# Patient Record
Sex: MALE | Race: ASIAN | NOT HISPANIC OR LATINO | Employment: FULL TIME | URBAN - METROPOLITAN AREA
[De-identification: names, ages, dates, MRNs, and addresses within clinical notes are randomized per-mention and may not be internally consistent; named-entity substitution may affect disease eponyms.]

---

## 2017-02-08 ENCOUNTER — OFFICE VISIT (OUTPATIENT)
Dept: FAMILY MEDICINE | Facility: CLINIC | Age: 32
End: 2017-02-08

## 2017-02-08 VITALS
BODY MASS INDEX: 22.79 KG/M2 | WEIGHT: 128.6 LBS | OXYGEN SATURATION: 97 % | HEIGHT: 63 IN | DIASTOLIC BLOOD PRESSURE: 70 MMHG | SYSTOLIC BLOOD PRESSURE: 110 MMHG | HEART RATE: 62 BPM | TEMPERATURE: 98.3 F

## 2017-02-08 DIAGNOSIS — E78.2 MIXED HYPERLIPIDEMIA: ICD-10-CM

## 2017-02-08 DIAGNOSIS — E55.9 VITAMIN D DEFICIENCY: ICD-10-CM

## 2017-02-08 DIAGNOSIS — F17.200 TOBACCO USE DISORDER: ICD-10-CM

## 2017-02-08 DIAGNOSIS — Z00.00 ROUTINE HISTORY AND PHYSICAL EXAMINATION OF ADULT: Primary | ICD-10-CM

## 2017-02-08 DIAGNOSIS — R73.09 ABNORMAL GLUCOSE: ICD-10-CM

## 2017-02-08 LAB — HBA1C MFR BLD: 5.5 % (ref 4–7)

## 2017-02-08 PROCEDURE — 83036 HEMOGLOBIN GLYCOSYLATED A1C: CPT | Performed by: FAMILY MEDICINE

## 2017-02-08 PROCEDURE — 80061 LIPID PANEL: CPT | Mod: 90 | Performed by: FAMILY MEDICINE

## 2017-02-08 PROCEDURE — 84450 TRANSFERASE (AST) (SGOT): CPT | Mod: 90 | Performed by: FAMILY MEDICINE

## 2017-02-08 PROCEDURE — 80048 BASIC METABOLIC PNL TOTAL CA: CPT | Mod: 90 | Performed by: FAMILY MEDICINE

## 2017-02-08 PROCEDURE — 82306 VITAMIN D 25 HYDROXY: CPT | Mod: 90 | Performed by: FAMILY MEDICINE

## 2017-02-08 PROCEDURE — 36415 COLL VENOUS BLD VENIPUNCTURE: CPT | Performed by: FAMILY MEDICINE

## 2017-02-08 PROCEDURE — 99395 PREV VISIT EST AGE 18-39: CPT | Performed by: FAMILY MEDICINE

## 2017-02-08 RX ORDER — VARENICLINE TARTRATE 1 MG/1
1 TABLET, FILM COATED ORAL 2 TIMES DAILY
Qty: 56 TABLET | Refills: 2 | Status: SHIPPED | OUTPATIENT
Start: 2017-02-08 | End: 2019-04-16

## 2017-02-08 NOTE — PROGRESS NOTES
SUBJECTIVE:    32 year old male presents for a physical exam.    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 (father had his first MI in his 30's)    Discussion regarding cardiac risk factors:    Family HX: yes    Tobacco use: yes, requests RX chantix/ has tried before- no significant side effects  Hypertension: No  Hyperlipidemia: yes   LDL CHOLESTEROL CALCULATED   Date Value Ref Range Status   2014 139* <130 mg/dL (calc) Final     Comment:        Desirable range <100 mg/dL for patients with CHD or  diabetes and <70 mg/dL for diabetic patients with  known heart disease.      ]  LDL CHOLESTEROL CALCULATED   Date Value Ref Range Status   2017 168* <130 mg/dL (calc) Final     Comment:        Desirable range <100 mg/dL for patients with CHD or  diabetes and <70 mg/dL for diabetic patients with  known heart disease.        ]    Diabetis:  No    Exercise: No formal program- time constraints  Diet:  Poor- fast food- does not like vegetables / picky eater    Health Care Maintenance  Flu shot: recommended    Vitamin D supplement- history of deficiency  He is not taking a supplement    Patient Active Problem List   Diagnosis     Cervicalgia     Fam hx-ischem heart disease     Mixed hyperlipidemia     Vitamin deficiency     Health Care Home     Living will, counseling/discussion     Abnormal glucose     Past Surgical History   Procedure Laterality Date     None       '  Family History   Problem Relation Age of Onset     C.A.D. Father      C.A.D. Paternal Uncle      Hypertension Father      Lipids Father      Maternal grandparents  young    No current outpatient prescriptions on file.     No current facility-administered medications for this visit.     Review  "Of Systems  Skin: negative  Eyes: negative  Ears/Nose/Throat: negative  Respiratory: No shortness of breath, dyspnea on exertion, cough, or hemoptysis/ encouraged tobacco cessation  Cardiovascular: negative- he denies exertional chest pain/ or change in exercise tolerance  Gastrointestinal: negative  Genitourinary: negative  Musculoskeletal: left shoulder and neck / cracks with ROM of shoulder- doing OK- sees chiropracter (also treats low back pain)  Neurologic: negative  Psychiatric: negative  Hematologic/Lymphatic/Immunologic: negative  Endocrine: negative    OBJECTIVE:  /70 mmHg  Pulse 62  Temp(Src) 98.3  F (36.8  C) (Oral)  Ht 1.6 m (5' 3\")  Wt 58.333 kg (128 lb 9.6 oz)  BMI 22.79 kg/m2  SpO2 97%  General appearance: Healthy.  Skin: Normal.  Mental Status: cooperative, normal affect, no gross thought process defects.  HEENT:   Ears- Normal external canals and TMs  Eyes- PERRL, EOM's intact   Throat- Normal  Nodes-Negative  Thyroid: Normal to palpation.   No enlargement or nodules palpated.  Lungs: Clear to auscultation.   Heart.: Normal rate and rhythm.  No murmurs, clicks, or gallops.  Pulses: C- 4/4, R-4/4, F-4/4, P-4/4, PT-4/4, DP-4/4  Abdomen: BS active. Soft, non-tender, no masses or organomegaly.  Aorta normal. No bruits.  Genitalia: Normal testicular exam, no hernia  Rectal:Deferred  Extremities: Normal   Neuro: no focal findings    Assessment   (Z00.00) Routine history and physical examination of adult  (primary encounter diagnosis)  Comment: multiple risk factors for coronary artery disease- initiate  statin and ASA or offer consult to preventative cardiology  Plan: Lipid Profile, VENOUS COLLECTION, BASIC         METABOLIC PANEL (QUEST), Vitamin D Deficiency,         Hemoglobin A1c (B[facility])            (E55.9) Vitamin D deficiency  Comment:  High dose vitamin D for 20 weeks/ then take daily dose 1000 IU daily  Plan: VENOUS COLLECTION, Vitamin D Deficiency            (R73.09) Abnormal " glucose  Comment: Normal Hemoglobin A1C/ 5.5  Plan: VENOUS COLLECTION, BASIC METABOLIC PANEL         (QUEST), Hemoglobin A1c (BFP)            (E78.2) Mixed hyperlipidemia  Comment: high LDL/ start statin after baseline AST obtained  Plan: Lipid Profile, VENOUS COLLECTION              (F17.200) Tobacco use disorder  Comment: smoking cessation encouraged  Plan: varenicline (CHANTIX STARTING MONTH RAYN) 0.5 MG        X 11 & 1 MG X 42 tablet, varenicline (CHANTIX)         1 MG tablet

## 2017-02-08 NOTE — LETTER
[facility]  [facility] [address]  [place]  [address]    February 10, 2017        Lisa Cuellar  [address] [facility] [address]              Dear Lisa Cuellar      LAB RESULTS:     The results of your recent Diabetis Screening Test (Hemoglobin A1C) and kidney function tests were NORMAL.    Vitamin D Level is VERY LOW. Vitamin D is important for bone and nerve health. I have sent a prescription for high dose Vitamin D3- 50,0000 IU to be dosed once a WEEK for 20 weeks.  After completing , start daily Vitamin D3 2000 IU daily.    Lipid profile is Abnormal.  Low HDL or good cholesterol 38 (goal is above 40- improves with exercise)  High LDL or bad cholesterol 168 - goal is less than 130 (improves with a high fiber low fat diet- lots of fruits, vegetables and whole grains and avoid fast and processed food)  Normal triglycerides.    Because you have many risk factors for heart disease, I am recommending starting a cholesterol lowering medication (atorvastatin 10 mg daily). Repeat your lipid profile 8-12 weeks after starting atorvastatin. This can be a nonfasting lab appointment if you have no concerns.  Take a baby aspirin daily (81 mg)  STOP SMOKING    If you would prefer, I can refer you to cardiology for a second opinion. Let me know.          Roxy Monsivais MD   [phone]

## 2017-02-09 LAB — VITAMIN D, 25-OH, TOTAL - QUEST: 5 NG/ML (ref 30–100)

## 2017-02-11 LAB
AST SERPL-CCNC: 25 U/L (ref 10–40)
BUN SERPL-MCNC: 12 MG/DL (ref 7–25)
BUN/CREATININE RATIO: ABNORMAL (CALC) (ref 6–22)
CALCIUM SERPL-MCNC: 10.1 MG/DL (ref 8.6–10.3)
CHLORIDE SERPLBLD-SCNC: 106 MMOL/L (ref 98–110)
CHOLEST SERPL-MCNC: 232 MG/DL (ref 125–200)
CHOLEST/HDLC SERPL: 6.1 (CALC)
CO2 SERPL-SCNC: 24 MMOL/L (ref 20–31)
CREAT SERPL-MCNC: 0.84 MG/DL (ref 0.6–1.35)
EGFR AFRICAN AMERICAN - QUEST: 133 ML/MIN/1.73M2
GFR SERPL CREATININE-BSD FRML MDRD: 115 ML/MIN/1.73M2
GLUCOSE - QUEST: 106 MG/DL (ref 65–99)
HDLC SERPL-MCNC: 38 MG/DL
LDLC SERPL CALC-MCNC: 168 MG/DL (CALC)
NONHDLC SERPL-MCNC: 194 MG/DL (CALC)
POTASSIUM SERPL-SCNC: 4 MMOL/L (ref 3.5–5.3)
SODIUM SERPL-SCNC: 139 MMOL/L (ref 135–146)
TRIGL SERPL-MCNC: 130 MG/DL

## 2017-02-13 RX ORDER — ATORVASTATIN CALCIUM 10 MG/1
10 TABLET, FILM COATED ORAL DAILY
Qty: 90 TABLET | Refills: 0 | Status: SHIPPED | OUTPATIENT
Start: 2017-02-13 | End: 2017-06-13

## 2017-06-13 ENCOUNTER — TELEPHONE (OUTPATIENT)
Dept: FAMILY MEDICINE | Facility: CLINIC | Age: 32
End: 2017-06-13

## 2017-06-13 DIAGNOSIS — E55.9 VITAMIN D DEFICIENCY: ICD-10-CM

## 2017-06-13 DIAGNOSIS — M79.10 MUSCLE PAIN: Primary | ICD-10-CM

## 2017-06-13 DIAGNOSIS — E78.2 MIXED HYPERLIPIDEMIA: ICD-10-CM

## 2017-06-13 RX ORDER — ATORVASTATIN CALCIUM 10 MG/1
10 TABLET, FILM COATED ORAL DAILY
Qty: 30 TABLET | Refills: 0 | COMMUNITY
Start: 2017-06-13 | End: 2017-07-03

## 2017-06-13 NOTE — TELEPHONE ENCOUNTER
Pt is requesting the following prescription.     Pending Prescriptions:                       Disp   Refills    cholecalciferol (VITAMIN D3) 56765 UNITS *20 cap*0            Sig: Take 1 capsule (50,000 Units) by mouth once a           week    Pt is to be on high dose of Vitamin D for 20 weeks.   Pt start Vitamin D on 2/10/17.    Do you want to refill this prescription?    Balbina.SHERRI Rosado ([facility])

## 2017-06-13 NOTE — TELEPHONE ENCOUNTER
Patient is due for a medication recheck for the following medication ATORVASTATIN ,and meets the qualifications for a physician approved 30 day extension.    A #30 day refill has been called into the pharmacy listed.  Faxed into [facility] in [facility]      staff, per the refill protocol can you please call the patient and help assist in setting up a  FASTING medication recheck.  Please attempt to reach the patient to schedule that appointment, and if you are unable to reach them, please forward back to the prescribing physician.      Telephone Information:   Mobile [phone] (home) [phone] (work)      Thank You  SHERRI Godoy ([facility])

## 2017-06-14 NOTE — TELEPHONE ENCOUNTER
50,000 IU weekly does not need a prescription (is available over the counter)- I have sent RX in past    SHOULD GET A VITAMIN D LEVEL- this can be a lab only   Vitamin D can be detrimental if you take too much    I will also check his CK  Standing orders in epic    Lab appointment is OK, but would also see him in the office if he wishes

## 2017-06-14 NOTE — TELEPHONE ENCOUNTER
Called Pt to help him set up a Lab only to check his Vitamin D and his CK.   Got his VM and left a message to have him call us back to set this up.   Left him the CS line. This is also NON-FASTING and can be in the evening to help him not have to miss work.     Balbina.SHERRI Rosado ([facility])

## 2017-07-03 ENCOUNTER — OFFICE VISIT (OUTPATIENT)
Dept: FAMILY MEDICINE | Facility: CLINIC | Age: 32
End: 2017-07-03

## 2017-07-03 VITALS
WEIGHT: 128.8 LBS | HEART RATE: 81 BPM | DIASTOLIC BLOOD PRESSURE: 80 MMHG | SYSTOLIC BLOOD PRESSURE: 110 MMHG | HEIGHT: 63 IN | TEMPERATURE: 97.1 F | BODY MASS INDEX: 22.82 KG/M2 | OXYGEN SATURATION: 97 %

## 2017-07-03 DIAGNOSIS — M79.10 MUSCLE PAIN: ICD-10-CM

## 2017-07-03 DIAGNOSIS — E78.2 MIXED HYPERLIPIDEMIA: ICD-10-CM

## 2017-07-03 DIAGNOSIS — F17.200 TOBACCO USE DISORDER: ICD-10-CM

## 2017-07-03 PROCEDURE — 82306 VITAMIN D 25 HYDROXY: CPT | Mod: 90 | Performed by: FAMILY MEDICINE

## 2017-07-03 PROCEDURE — 99213 OFFICE O/P EST LOW 20 MIN: CPT | Performed by: FAMILY MEDICINE

## 2017-07-03 PROCEDURE — 84450 TRANSFERASE (AST) (SGOT): CPT | Mod: 90 | Performed by: FAMILY MEDICINE

## 2017-07-03 PROCEDURE — 36415 COLL VENOUS BLD VENIPUNCTURE: CPT | Performed by: FAMILY MEDICINE

## 2017-07-03 PROCEDURE — 80061 LIPID PANEL: CPT | Mod: 90 | Performed by: FAMILY MEDICINE

## 2017-07-03 RX ORDER — ATORVASTATIN CALCIUM 10 MG/1
10 TABLET, FILM COATED ORAL DAILY
Qty: 90 TABLET | Refills: 0 | Status: SHIPPED | OUTPATIENT
Start: 2017-07-03 | End: 2017-07-05

## 2017-07-03 NOTE — PROGRESS NOTES
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  Inconsistently- Taking cholesterol meds about every other day for the past couple of weeks- )        Other lipid medications/supplements?:  none      Amount of exercise or physical activity: no routine exercise program    Problems taking medications regularly: above    Medication side effects: none      PROBLEMS TO ADD ON...  Tobacco use- had stopped smoking for six months (didn't think it was that hard with Chantix- wants to try to stop again after August)    Problem list and histories reviewed & adjusted, as indicated.  Additional history: as documented    History of low vitamin D- he is not currently taking a supplement       Patient Active Problem List   Diagnosis     Cervicalgia     Fam hx-ischem heart disease     Mixed hyperlipidemia     Vitamin deficiency     Health Care Home     ACP (advance care planning)     Abnormal glucose     Past Surgical History:   Procedure Laterality Date     none         Social History   Substance Use Topics     Smoking status: Current Every Day Smoker     Packs/day: 1.00     Types: Cigarettes     Smokeless tobacco: Never Used     Alcohol use 0.0 oz/week     0 drink(s) per week      Comment: 3-4 per month     Family History   Problem Relation Age of Onset     C.A.D. Father      C.A.D. Paternal Uncle      Hypertension Father      Lipids Father          Current Outpatient Prescriptions   Medication Sig Dispense Refill     atorvastatin (LIPITOR) 10 MG tablet Take 1 tablet (10 mg) by mouth daily 90 tablet 0     varenicline (CHANTIX STARTING MONTH PAK) 0.5 MG X 11 & 1 MG X 42 tablet Take 0.5 mg tab daily for 3 days, then 0.5 mg tab twice daily for 4 days, then 1 mg twice  "daily. 53 tablet 0     cholecalciferol (VITAMIN D3) 40934 UNITS capsule Take 1 capsule (50,000 Units) by mouth once a week 20 capsule 0     varenicline (CHANTIX) 1 MG tablet Take 1 tablet (1 mg) by mouth 2 times daily (Patient not taking: Reported on 7/3/2017) 56 tablet 2       Reviewed and updated as needed this visit by clinical staff  Tobacco  Allergies  Meds  Problems       Reviewed and updated as needed this visit by Provider         ROS:  C: NEGATIVE for fever, chills, change in weight  E/M: NEGATIVE for ear, mouth and throat problems  R: NEGATIVE for significant cough or SOB  CV: NEGATIVE for chest pain, palpitations or peripheral edema    OBJECTIVE:     /80 (BP Location: Right arm, Patient Position: Chair, Cuff Size: Adult Regular)  Pulse 81  Temp 97.1  F (36.2  C) (Oral)  Ht 1.588 m (5' 2.5\")  Wt 58.4 kg (128 lb 12.8 oz)  SpO2 97%  BMI 23.18 kg/m2  Body mass index is 23.18 kg/(m^2).   Regular rate and  rhythm. S1 and S2 normal, no murmurs, clicks, gallops or rubs. No edema or JVD. Chest is clear; no wheezes or rales.    Diagnostic Test Results:  Results for orders placed or performed in visit on 07/03/17   Vitamin D Deficiency   Result Value Ref Range    Vitamin D 25-OH Total 42 30 - 100 ng/mL   Lipid Profile   Result Value Ref Range    Cholesterol 160 125 - 200 mg/dL    HDL Cholesterol 38 (L) > OR = 40 mg/dL    Triglycerides 79 <150 mg/dL    LDL Cholesterol Calculated 106 <130 mg/dL (calc)    Cholesterol/HDL Ratio 4.2 < OR = 5.0 (calc)    Non HDL Cholesterol 122 mg/dL (calc)   AST (QUEST)   Result Value Ref Range    AST 21 10 - 40 U/L       ASSESSMENT/PLAN:     Problem List Items Addressed This Visit     Mixed hyperlipidemia    Relevant Medications    atorvastatin (LIPITOR) 10 MG tablet    Other Relevant Orders    Lipid Profile (Completed)    AST (QUEST) (Completed)      Other Visit Diagnoses     Muscle pain        Tobacco use disorder        Relevant Medications    varenicline (CHANTIX " STARTING MONTH RYAN) 0.5 MG X 11 & 1 MG X 42 tablet           Tobacco Cessation:   reports that he has been smoking Cigarettes.  He has been smoking about 1.00 pack per day. He has never used smokeless tobacco.  Tobacco Cessation Action Plan: Pharmacotherapies : Chantix      MEDICATIONS:  Continue current medications without change  FUTURE APPOINTMENTS:       - Follow-up visit in 12 months    Roxy Monsivais MD  [facility]

## 2017-07-03 NOTE — MR AVS SNAPSHOT
"              After Visit Summary   7/3/2017    Lisa Cuellar    MRN: [id number]           Patient Information     Date Of Birth          1985        Visit Information        Provider Department      7/3/2017 3:15 PM Roxy Monsivais MD [facility]        Today's Diagnoses     Mixed hyperlipidemia        Tobacco use disorder        Muscle pain           Follow-ups after your visit        Who to contact     If you have questions or need follow up information about today's clinic visit or your schedule please contact [facility] directly at [phone].  Normal or non-critical lab and imaging results will be communicated to you by EasyQasahart, letter or phone within 4 business days after the clinic has received the results. If you do not hear from us within 7 days, please contact the clinic through EasyQasahart or phone. If you have a critical or abnormal lab result, we will notify you by phone as soon as possible.  Submit refill requests through GuidesMob or call your pharmacy and they will forward the refill request to us. Please allow 3 business days for your refill to be completed.          Additional Information About Your Visit        MyChart Information     GuidesMob lets you send messages to your doctor, view your test results, renew your prescriptions, schedule appointments and more. To sign up, go to [email or web address] . Click on \"Log in\" on the left side of the screen, which will take you to the Welcome page. Then click on \"Sign up Now\" on the right side of the page.     You will be asked to enter the access code listed below, as well as some personal information. Please follow the directions to create your username and password.     Your access code is: [email or web address]  Expires: 10/3/2017 10:07 PM     Your access code will  in 90 days. If you need help or a new code, please call your [place] clinic or [phone].        Care EveryWhere ID     This " "is your Care EveryWhere ID. This could be used by other organizations to access your [place] medical records  [id number]        Your Vitals Were     Pulse Temperature Height Pulse Oximetry BMI (Body Mass Index)       81 97.1  F (36.2  C) (Oral) 1.588 m (5' 2.5\") 97% 23.18 kg/m2        Blood Pressure from Last 3 Encounters:   07/03/17 110/80   02/08/17 110/70   08/25/14 103/79    Weight from Last 3 Encounters:   07/03/17 58.4 kg (128 lb 12.8 oz)   02/08/17 58.3 kg (128 lb 9.6 oz)   08/25/14 56.7 kg (125 lb)              We Performed the Following     AST (QUEST)     Lipid Profile     VENOUS COLLECTION     Vitamin D Deficiency          Today's Medication Changes          These changes are accurate as of: 7/3/17 11:59 PM.  If you have any questions, ask your nurse or doctor.               Start taking these medicines.        Dose/Directions    atorvastatin 10 MG tablet   Commonly known as:  LIPITOR   Used for:  Mixed hyperlipidemia   Started by:  Roxy Monsivais MD        Dose:  10 mg   Take 1 tablet (10 mg) by mouth daily   Quantity:  90 tablet   Refills:  3            Where to get your medicines      These medications were sent to [address] [address] [address] [place] [address] [facility] [address]    Hours:  Tech issues with their phone system Phone:  [phone]     atorvastatin 10 MG tablet    varenicline 0.5 MG X 11 & 1 MG X 42 tablet                Primary Care Provider Office Phone # Fax #    Roxy Monsivais [phone]       [facility] [address]  [facility] [address]        Equal Access to Services     DANNY OROSCO AH: Hadii birgit silva Sonathalie, waaxda luqadaha, qaybta kaalmada adeegyada, sumeet trujillo. So [facility] [phone].    ATENCIÓN: Si habla español, tiene a mckeon disposición servicios gratuitos de asistencia lingüística. Llame al [phone].    We comply with applicable federal civil " rights laws and [place] laws. We do not discriminate on the basis of race, color, national origin, age, disability sex, sexual orientation or gender identity.            Thank you!     Thank you for choosing [facility]  for your care. Our goal is always to provide you with excellent care. Hearing back from our patients is one way we can continue to improve our services. Please take a few minutes to complete the written survey that you may receive in the mail after your visit with us. Thank you!             Your Updated Medication List - Protect others around you: Learn how to safely use, store and throw away your medicines at www.disposemymeds.org.          This list is accurate as of: 7/3/17 11:59 PM.  Always use your most recent med list.                   Brand Name Dispense Instructions for use Diagnosis    atorvastatin 10 MG tablet    LIPITOR    90 tablet    Take 1 tablet (10 mg) by mouth daily    Mixed hyperlipidemia       cholecalciferol 42940 UNITS capsule    VITAMIN D3    20 capsule    Take 1 capsule (50,000 Units) by mouth once a week    Vitamin D deficiency       * varenicline 1 MG tablet    CHANTIX    56 tablet    Take 1 tablet (1 mg) by mouth 2 times daily    Tobacco use disorder       * varenicline 0.5 MG X 11 & 1 MG X 42 tablet    CHANTIX STARTING MONTH RYAN    53 tablet    Take 0.5 mg tab daily for 3 days, then 0.5 mg tab twice daily for 4 days, then 1 mg twice daily.    Tobacco use disorder       * Notice:  This list has 2 medication(s) that are the same as other medications prescribed for you. Read the directions carefully, and ask your doctor or other care provider to review them with you.

## 2017-07-03 NOTE — NURSING NOTE
Mando is here for fasting medication recheck.     Pre-Visit Screening :  Immunizations : up to date  Colon Screening : NA  Asthma Action Test/Plan : NA  PHQ9/GAD7 :  NA    Pulse - regular  My Chart - accepts    CLASSIFICATION OF OVERWEIGHT AND OBESITY BY BMI                         Obesity Class           BMI(kg/m2)  Underweight                                    < 18.5  Normal                                         18.5-24.9  Overweight                                     25.0-29.9  OBESITY                     I                  30.0-34.9                              II                 35.0-39.9  EXTREME OBESITY             III                >40                             Patient's  BMI Body mass index is 23.18 kg/(m^2).  http://hin.nhlbi.nih.gov/menuplanner/menu.cgi  Questioned patient about current smoking habits.  Pt. currently smokes.  Advised about smoking cessation.    SHERRI Godoy ([facility])

## 2017-07-03 NOTE — LETTER
[facility]  [place] [facility] [address]  [place]  [address]    July 5, 2017        Lisa Cuellar  [address] [facility] [address]              Dear Lisa Cuellar      LAB RESULTS:     The results of your recent Lipid profile, Vitamin D and Liver Function Test were NORMAL.    Atorvastatin was refilled at your current dose for one year.    The recommended dose of Vitamin D3 is 1000 IU daily (no need for the high dose).    When you can, try to stop smoking- Chantix RX was refilled.    Call me if you have any questions at  [phone].          Roxy Monsivais MD

## 2017-07-04 LAB
AST SERPL-CCNC: 21 U/L (ref 10–40)
CHOLEST SERPL-MCNC: 160 MG/DL (ref 125–200)
CHOLEST/HDLC SERPL: 4.2 (CALC)
HDLC SERPL-MCNC: 38 MG/DL
LDLC SERPL CALC-MCNC: 106 MG/DL (CALC)
NONHDLC SERPL-MCNC: 122 MG/DL (CALC)
TRIGL SERPL-MCNC: 79 MG/DL
VITAMIN D, 25-OH, TOTAL - QUEST: 42 NG/ML (ref 30–100)

## 2017-07-05 RX ORDER — ATORVASTATIN CALCIUM 10 MG/1
10 TABLET, FILM COATED ORAL DAILY
Qty: 90 TABLET | Refills: 3 | Status: SHIPPED | OUTPATIENT
Start: 2017-07-05 | End: 2020-01-13

## 2018-06-18 ENCOUNTER — OFFICE VISIT (OUTPATIENT)
Dept: FAMILY MEDICINE | Facility: CLINIC | Age: 33
End: 2018-06-18

## 2018-06-18 VITALS
BODY MASS INDEX: 24.3 KG/M2 | TEMPERATURE: 99.4 F | DIASTOLIC BLOOD PRESSURE: 64 MMHG | HEART RATE: 66 BPM | SYSTOLIC BLOOD PRESSURE: 102 MMHG | WEIGHT: 135 LBS | OXYGEN SATURATION: 99 %
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
## 2018-06-18 DIAGNOSIS — S46.912A REPETITIVE STRAIN INJURY OF LEFT SHOULDER, INITIAL ENCOUNTER: Primary | ICD-10-CM

## 2018-06-18 DIAGNOSIS — G57.01 PYRIFORMIS SYNDROME, RIGHT: ICD-10-CM

## 2018-06-18 DIAGNOSIS — B35.4 TINEA CORPORIS: ICD-10-CM

## 2018-06-18 PROCEDURE — 99214 OFFICE O/P EST MOD 30 MIN: CPT | Performed by: FAMILY MEDICINE

## 2018-06-18 PROCEDURE — 84460 ALANINE AMINO (ALT) (SGPT): CPT | Mod: 90 | Performed by: FAMILY MEDICINE

## 2018-06-18 PROCEDURE — 36415 COLL VENOUS BLD VENIPUNCTURE: CPT | Performed by: FAMILY MEDICINE

## 2018-06-18 RX ORDER — TERBINAFINE HYDROCHLORIDE 250 MG/1
250 TABLET ORAL DAILY
Qty: 14 TABLET | Refills: 0 | Status: SHIPPED | OUTPATIENT
Start: 2018-06-18 | End: 2019-04-16

## 2018-06-18 NOTE — MR AVS SNAPSHOT
"              After Visit Summary   6/18/2018    Lisa Cuellar    MRN: [id number]           Patient Information     Date Of Birth          1985        Visit Information        Provider Department      6/18/2018 2:45 PM Roxy Monsivais MD [facility]        Today's Diagnoses     Repetitive strain injury of left shoulder, initial encounter    -  1    Pyriformis syndrome, right        Tinea corporis           Follow-ups after your visit        Additional Services     PHYSICAL THERAPY REFERRAL       Physical Therapy - [facility]   [place]  location   [phone] -- appt line     Treatment: Evaluation & Treatment  Special Instructions/Modalities:    Special Programs: None    Please be aware that coverage of these services is subject to the terms and limitations of your health insurance plan.  Call member services at your health plan with any benefit or coverage questions.      **Note to Provider:  If you are referring outside of [facility] for the therapy appointment, please list the name of the location in the \"special instructions\" above, print the referral and give to the patient to schedule the appointment.                  Who to contact     If you have questions or need follow up information about today's clinic visit or your schedule please contact [facility] directly at [phone].  Normal or non-critical lab and imaging results will be communicated to you by MyChart, letter or phone within 4 business days after the clinic has received the results. If you do not hear from us within 7 days, please contact the clinic through MyChart or phone. If you have a critical or abnormal lab result, we will notify you by phone as soon as possible.  Submit refill requests through [employer] or call your pharmacy and they will forward the refill request to us. Please allow 3 business days for your refill to be completed.          Additional Information About Your Visit      "   Care EveryWhere ID     This is your Care EveryWhere ID. This could be used by other organizations to access your [facility] medical records  [id number]        Your Vitals Were     Pulse Temperature Pulse Oximetry BMI (Body Mass Index)          66 99.4  F (37.4  C) (Oral) 99% 24.3 kg/m2         Blood Pressure from Last 3 Encounters:   06/18/18 102/64   07/03/17 110/80   02/08/17 110/70    Weight from Last 3 Encounters:   06/18/18 61.2 kg (135 lb)   07/03/17 58.4 kg (128 lb 12.8 oz)   02/08/17 58.3 kg (128 lb 9.6 oz)              We Performed the Following     ALT (QUEST)     PHYSICAL THERAPY REFERRAL     VENOUS COLLECTION          Today's Medication Changes          These changes are accurate as of 6/18/18  3:16 PM.  If you have any questions, ask your nurse or doctor.               Start taking these medicines.        Dose/Directions    terbinafine 250 MG tablet   Commonly known as:  lamISIL   Used for:  Tinea corporis   Started by:  Roxy Monsivais MD        Dose:  250 mg   Take 1 tablet (250 mg) by mouth daily   Quantity:  14 tablet   Refills:  0            Where to get your medicines      These medications were sent to [address] [address]    Hours:  Tech issues with their phone system Phone:  [phone]     terbinafine 250 MG tablet                Primary Care Provider Office Phone # Fax #    Roxy Monsivais [phone] [address] [place] [address]        Equal Access to Services     [facility]: Hadii birgit ku hadasho Soomaali, waaxda luqadaha, qaybta kaalmada adeegyasonia, sumeet idicarroll trujillo. So [facility] [phone].    ATENCIÓN: Si habla español, tiene a mckeon disposición servicios gratuitos de asistencia lingüística. Llame al [phone].    We comply with applicable federal civil rights laws and [place] laws. We do not discriminate on the basis of race,  color, national origin, age, disability, sex, sexual orientation, or gender identity.            Thank you!     Thank you for choosing [facility]  for your care. Our goal is always to provide you with excellent care. Hearing back from our patients is one way we can continue to improve our services. Please take a few minutes to complete the written survey that you may receive in the mail after your visit with us. Thank you!             Your Updated Medication List - Protect others around you: Learn how to safely use, store and throw away your medicines at www.disposemymeds.org.          This list is accurate as of 6/18/18  3:16 PM.  Always use your most recent med list.                   Brand Name Dispense Instructions for use Diagnosis    atorvastatin 10 MG tablet    LIPITOR    90 tablet    Take 1 tablet (10 mg) by mouth daily    Mixed hyperlipidemia       cholecalciferol 74060 units capsule    VITAMIN D3    20 capsule    Take 1 capsule (50,000 Units) by mouth once a week    Vitamin D deficiency       terbinafine 250 MG tablet    lamISIL    14 tablet    Take 1 tablet (250 mg) by mouth daily    Tinea corporis       * varenicline 1 MG tablet    CHANTIX    56 tablet    Take 1 tablet (1 mg) by mouth 2 times daily    Tobacco use disorder       * varenicline 0.5 MG X 11 & 1 MG X 42 tablet    CHANTIX STARTING MONTH RYAN    53 tablet    Take 0.5 mg tab daily for 3 days, then 0.5 mg tab twice daily for 4 days, then 1 mg twice daily.    Tobacco use disorder       * Notice:  This list has 2 medication(s) that are the same as other medications prescribed for you. Read the directions carefully, and ask your doctor or other care provider to review them with you.

## 2018-06-18 NOTE — NURSING NOTE
Lisa is here due to skin fungal infection, leg and shoulder pain that has been acting up at work.    Pre-visit Screening:  Immunizations:  up to date  Colonoscopy:  NA  Mammogram: NA  Asthma Action Test/Plan:  No concerns  PHQ9:  NA  GAD7:  NA  Questioned patient about current smoking habits Pt. smokes 20 cigerettes a day  Ok to leave detailed message on voice mail for today's visit only Yes, phone # [phone]

## 2018-06-18 NOTE — PROGRESS NOTES
SUBJECTIVE:  33 year old female presents with the following concern:    Fungus mid upper back and shoulders  His wife recently developed similar symptoms and was treated for a fungal infection after KOH test in her doctor's office- he would like to be treated as well  He has been bothered with this rash prior- but never treated.  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  Current Outpatient Prescriptions   Medication     atorvastatin (LIPITOR) 10 MG tablet     cholecalciferol (VITAMIN D3) 94927 UNITS capsule     terbinafine (LAMISIL) 250 MG tablet     varenicline (CHANTIX STARTING MONTH PAK) 0.5 MG X 11 & 1 MG X 42 tablet     varenicline (CHANTIX) 1 MG tablet     No current facility-administered medications for this visit.         ROS: 7 point ROS neg other than the symptoms noted above in the HPI.    OBJECTIVE:  /64 (BP Location: Left arm, Patient Position: Sitting, Cuff Size: Adult Regular)  Pulse 66  Temp 99.4  F (37.4  C) (Oral)  Wt 61.2 kg (135 lb)  SpO2 99%  BMI 24.3 kg/m2   Rash as described above  Left shoulder: negative impingement sign  Full ROM  No weakness of proximal or distal UE    Negative straight leg raising  Normal ROM of hips  Absent achilles reflexes  Normal patella reflexes  Pain seems better with gluteal stretching    Assessment   Lumbar strain  Pyriformis strain  Left shoulder strain  Tinea corporis    PLAN:  Referred for PT  Oral terbinafine

## 2018-06-19 ENCOUNTER — TELEPHONE (OUTPATIENT)
Dept: FAMILY MEDICINE | Facility: CLINIC | Age: 33
End: 2018-06-19

## 2018-06-19 LAB — ALT SERPL-CCNC: 35 U/L (ref 9–46)

## 2019-04-16 ENCOUNTER — OFFICE VISIT (OUTPATIENT)
Dept: FAMILY MEDICINE | Facility: CLINIC | Age: 34
End: 2019-04-16

## 2019-04-16 VITALS
TEMPERATURE: 98 F | SYSTOLIC BLOOD PRESSURE: 108 MMHG | DIASTOLIC BLOOD PRESSURE: 70 MMHG | BODY MASS INDEX: 23.74 KG/M2 | WEIGHT: 134 LBS | HEART RATE: 80 BPM | HEIGHT: 63 IN | OXYGEN SATURATION: 98 %

## 2019-04-16 DIAGNOSIS — S05.01XA ABRASION OF RIGHT CORNEA, INITIAL ENCOUNTER: Primary | ICD-10-CM

## 2019-04-16 PROCEDURE — 99213 OFFICE O/P EST LOW 20 MIN: CPT | Performed by: PHYSICIAN ASSISTANT

## 2019-04-16 RX ORDER — POLYMYXIN B SULFATE AND TRIMETHOPRIM 1; 10000 MG/ML; [USP'U]/ML
1-2 SOLUTION OPHTHALMIC 4 TIMES DAILY
Qty: 10 ML | Refills: 0 | Status: SHIPPED | OUTPATIENT
Start: 2019-04-16 | End: 2019-04-23

## 2019-04-16 ASSESSMENT — MIFFLIN-ST. JEOR: SCORE: 1430.01

## 2019-04-16 NOTE — NURSING NOTE
Lisa is here because he got poked in the eye while playing with his daughter and wants to get his right eye checked.          Pre-visit Screening:  Immunizations:  up to date  Colonoscopy:  NA  Mammogram: NA  Asthma Action Test/Plan:  NA  PHQ9:  None  GAD7:  None  Questioned patient about current smoking habits Pt. smokes 20 cigerettes a day  Ok to leave detailed message on voice mail for today's visit only Yes, phone # [phone]

## 2019-04-16 NOTE — PROGRESS NOTES
"CC: Eye Problem    History:  Last night, around 10 PM, Angel's daughter was playing with him, and accidentally put her finger in his right eye. His eye immediately was watering quite a bit, and he continues to feel a foreign body sensation at the area where his daughter poked him. While pain and watering is better today, he continues to have thicker clear tears. No pus. Did try covering at work today, but once he removed the gauze, surface of his eye was more red. Vision seems somewhat blurry, intermittent light sensitivity. He does not use contacts or have corrected vision.     PMH, MEDICATIONS, ALLERGIES, SOCIAL AND FAMILY HISTORY in [facility] and reviewed by me personally.      ROS negative other than the symptoms noted above in the HPI.        Examination   /70 (BP Location: Left arm, Patient Position: Sitting, Cuff Size: Adult Large)   Pulse 80   Temp 98  F (36.7  C) (Oral)   Ht 1.588 m (5' 2.5\")   Wt 60.8 kg (134 lb)   SpO2 98%   BMI 24.12 kg/m         Constitutional: Sitting comfortably, in no acute distress. Vital signs noted  Eyes: pupils equal round reactive to light and accomodation, extra ocular movements intact. Fluorescein eye exam shows corneal abrasion 3 mm by 2 mm over medial aspect of iris of right eye.   Cardiovascular:  regular rate and rhythm, no murmurs, clicks, or gallops  Respiratory:  normal respiratory rate and rhythm, lungs clear to auscultation  SKIN: No jaundice/pallor/rash.   Psychiatric: mentation appears normal and affect normal/bright        A/P    ICD-10-CM    1. Abrasion of right cornea, initial encounter S05.01XA trimethoprim-polymyxin b (POLYTRIM) 58704-7.1 UNIT/ML-% ophthalmic solution       DISCUSSION:  Consistent with a corneal abrasion. Explained that this should resolve on it's own over the next 3-7 days. Did recommended he complete 5-7 day course of Polytrim drops 4 times daily to help prevent infection. Monitor for side effects, and let me know if noted. Contact me " in 2-3 days or proceed to eye doctor if not noticing significant improvement. Would help with this process as pt does not have an eye doctor. Okay to patch it, but not necessary for healing.     follow up visit: As needed    Christen Floyd PA-C  [facility]

## 2020-01-13 ENCOUNTER — OFFICE VISIT (OUTPATIENT)
Dept: FAMILY MEDICINE | Facility: CLINIC | Age: 35
End: 2020-01-13

## 2020-01-13 VITALS
HEART RATE: 64 BPM | SYSTOLIC BLOOD PRESSURE: 120 MMHG | HEIGHT: 63 IN | RESPIRATION RATE: 20 BRPM | TEMPERATURE: 98.5 F | WEIGHT: 138 LBS | DIASTOLIC BLOOD PRESSURE: 78 MMHG | BODY MASS INDEX: 24.45 KG/M2

## 2020-01-13 DIAGNOSIS — G89.29 CHRONIC LEFT SHOULDER PAIN: ICD-10-CM

## 2020-01-13 DIAGNOSIS — M25.512 CHRONIC LEFT SHOULDER PAIN: ICD-10-CM

## 2020-01-13 DIAGNOSIS — L60.8 NAIL DEFORMITY: ICD-10-CM

## 2020-01-13 DIAGNOSIS — G47.00 INSOMNIA, UNSPECIFIED TYPE: ICD-10-CM

## 2020-01-13 PROCEDURE — 99214 OFFICE O/P EST MOD 30 MIN: CPT | Performed by: FAMILY MEDICINE

## 2020-01-13 RX ORDER — ATORVASTATIN CALCIUM 10 MG/1
10 TABLET, FILM COATED ORAL DAILY
Qty: 90 TABLET | Refills: 3 | Status: CANCELLED | OUTPATIENT
Start: 2020-01-13

## 2020-01-13 ASSESSMENT — MIFFLIN-ST. JEOR: SCORE: 1456.09

## 2020-01-13 NOTE — PATIENT INSTRUCTIONS
Call and see how much it would cost to go to physical therapy    Tie thread around tags, frank darken and fall off      The following measures and instructions are designed to help you with your insomnia:     Sleep Hygiene     - Avoid stimulants (e.g., caffeine, nicotine) for several hours before bedtime. Caffeine can prevent sleep for up to 12 hours after ingested.   - Use alcohol in moderation (no more than 2 drinks daily) and avoid alcohol within 4 hours of bedtime, as it fragments sleep.   - Exercise regularly (especially in late afternoon or early evening).   - Allow at least a 1-hour period to unwind before bedtime.   - Keep the bedroom environment quiet, dark, and comfortable.  Do not have a television, radio or a computer in the bedroom.   - For some individuals, a white-noise machine (available online and in many stores) may help promote sleep.   - Maintain a regular sleep schedule.       Stimulus Control     - Go to bed only when sleepy - not just fatigued, but sleepy.   - When unable to sleep after 20 minutes, get out of bed, go to another room, and return to bed only when sleep is imminent.  During this time, you should engage in a pre-planned, non-stimulating activity (e.g. reading, jigsaw puzzle assembly, knitting, etc.) under low-power lighting.   - Curtail all sleep-incompatible activities when unable to sleep; no eating, TV watching, computer use, radio listening, planning or problem solving.   - Arise at a regular time every morning regardless of the amount of sleep the night before.   - Avoid daytime napping.     Insomnia literature reference:     SAY GOOD NIGHT TO INSOMNIA Monico Jenkins, Ph.D. [place]: [employer] 1998 219 pages, hardback, $23.00 ISBN 4-3608-6010-9

## 2020-01-13 NOTE — NURSING NOTE
Lisa Cuellar is here for possible toenail fungus.  Questioned patient about current smoking habits.  Pt. currently smokes.  Advised about smoking cessation.  PULSE regular  My Chart: declines  CLASSIFICATION OF OVERWEIGHT AND OBESITY BY BMI                        Obesity Class           BMI(kg/m2)  Underweight                                    < 18.5  Normal                                         18.5-24.9  Overweight                                     25.0-29.9  OBESITY                     I                  30.0-34.9                             II                 35.0-39.9  EXTREME OBESITY             III                >40                            Patient's  BMI Body mass index is 24.45 kg/m .  http://hin.nhlbi.nih.gov/menuplanner/menu.cgi  Pre-visit planning  Immunizations - up to date   Colonoscopy -   Mammogram -   Asthma -   PHQ9 -    ELVIRA-7 -

## 2020-01-13 NOTE — PROGRESS NOTES
"SUBJECTIVE:   34 year old female presents with the following concern:  His wife thinks he has a fungal infection in his toenails  Has faint linear lines- unchanged for yearsa  No nail pain  ? 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        Other concerns:  He is left handed- bothered with several month history of left shoulder pain  He moves 70 pound barrels-    He has full ROM but with pain  I recommended a trial of PT- he will check with his insurance    Other concerns:  He has problems with insomnia  Working two jobs- gets home between 9-10 pm  Then can't fall asleep  We talked about sleep hygiene- no TV- phone- before or while in bed  Caffeine: drinks an energy drink every afternoon-recommended DC  Discontinued smoking pot for the past 2-3 months- little effect on his sleep  He has tried OTC products- seems to worsen his symptoms    Patient Active Problem List   Diagnosis     Cervicalgia     Fam hx-ischem heart disease     Mixed hyperlipidemia     Vitamin deficiency     Health Care Home     ACP (advance care planning)     Abnormal glucose     Past Surgical History:   Procedure Laterality Date     none       Current Outpatient Medications   Medication     cholecalciferol (VITAMIN D3) 83275 UNITS capsule     No current facility-administered medications for this visit.       ROS: 10 point ROS neg other than the symptoms noted above in the HPI.    OBJECTIVE:  /78 (BP Location: Right arm, Patient Position: Chair, Cuff Size: Adult Regular)   Pulse 64   Temp 98.5  F (36.9  C) (Oral)   Resp 20   Ht 1.6 m (5' 3\")   Wt 62.6 kg (138 lb)   BMI 24.45 kg/m      Toenails all have a similar appearance- mild ridges- no abnormal thickness or deformity    Shoulder exam shows a normal shoulder musculature with no atrophy.  Normal ROM The A-C joint is non tender and the periscapular musculature is normal.    Psych: normal mental status.Affect is pleasant    Assessment   (L60.8) Nail " deformity  Comment:   Plan: observation- I did not recommend treatment    (G47.00) Insomnia, unspecified type  Comment:   Plan: sleep hygiene, discontinue energy drinks    (M25.512,  G89.29) Chronic left shoulder pain  Comment:    Plan: recommended a trial of PT- he will check with his insurance

## 2020-01-14 PROBLEM — G47.00 INSOMNIA, UNSPECIFIED TYPE: Status: ACTIVE | Noted: 2020-01-14

## 2020-01-14 PROBLEM — G89.29 CHRONIC LEFT SHOULDER PAIN: Status: ACTIVE | Noted: 2020-01-14

## 2020-01-14 PROBLEM — M25.512 CHRONIC LEFT SHOULDER PAIN: Status: ACTIVE | Noted: 2020-01-14

## 2020-01-25 ENCOUNTER — APPOINTMENT (OUTPATIENT)
Dept: GENERAL RADIOLOGY | Facility: CLINIC | Age: 35
End: 2020-01-25
Attending: EMERGENCY MEDICINE

## 2020-01-25 ENCOUNTER — HOSPITAL ENCOUNTER (EMERGENCY)
Facility: CLINIC | Age: 35
Discharge: HOME | End: 2020-01-25
Attending: EMERGENCY MEDICINE | Admitting: EMERGENCY MEDICINE

## 2020-01-25 VITALS
RESPIRATION RATE: 16 BRPM | HEART RATE: 91 BPM | TEMPERATURE: 98.6 F | SYSTOLIC BLOOD PRESSURE: 124 MMHG | OXYGEN SATURATION: 99 % | WEIGHT: 135 LBS | DIASTOLIC BLOOD PRESSURE: 74 MMHG | BODY MASS INDEX: 23.91 KG/M2

## 2020-01-25 DIAGNOSIS — S93.402A SPRAIN OF LEFT ANKLE, UNSPECIFIED LIGAMENT, INITIAL ENCOUNTER: ICD-10-CM

## 2020-01-25 PROCEDURE — 99285 EMERGENCY DEPT VISIT HI MDM: CPT

## 2020-01-25 PROCEDURE — 73610 X-RAY EXAM OF ANKLE: CPT | Mod: LT

## 2020-01-25 NOTE — LETTER
January 25, 2020      To Whom It May Concern:      Lisa Cuellar was seen in our Emergency Department today, 01/25/20.  I expect his condition to improve over the next 2-3 days.  He may return to work when improved.    Sincerely,        Laureen GOMEZ RN

## 2020-01-25 NOTE — ED AVS SNAPSHOT
[facility]  [address]  [facility] [address]  Phone:  [phone]  Fax:  [phone]                                    Lisa Cuellar   MRN: [id number]    Department:  [facility]   Date of Visit:  1/25/2020           After Visit Summary Signature Page    I have received my discharge instructions, and my questions have been answered. I have discussed any challenges I see with this plan with the nurse or doctor.    ..........................................................................................................................................  Patient/Patient Representative Signature      ..........................................................................................................................................  Patient Representative Print Name and Relationship to Patient    ..................................................               ................................................  Date                                   Time    ..........................................................................................................................................  Reviewed by Signature/Title    ...................................................              ..............................................  Date                                               Time          22EPIC Rev 08/18

## 2020-01-26 NOTE — ED PROVIDER NOTES
History     Chief Complaint:  Ankle Pain    HPI   Lisa Cuellar is a 34 year old male who presents with ankle pain. According to the patient, he was at an apartment complex about 4 hours ago, when he slipped on ice and injured his left ankle. He reports relatively severe pain and swelling, for which he took Advil right after the injury.     Allergies:  No known allergies      Medications:    The patient is currently on no regular medications.    Past Medical History:    Hyperlipidemia  Heart disease  Cervicalgia  Insomnia     Past Surgical History:    The patient does not have any pertinent past surgical history.    Family History:    CAD  Hypertension  Lipids    Social History:  Smoking status: current everyday smoker, 1 pack per day   Alcohol use: yes  Drug use: yes, marijuana  PCP: Roxy Monsivais  Presents to the ED alone  Marital Status:   [2]     Review of Systems   Musculoskeletal:        Let ankle injury   All other systems reviewed and are negative.        Physical Exam     Patient Vitals for the past 24 hrs:   BP Temp Temp src Pulse Heart Rate Resp SpO2 Weight   01/25/20 2203 124/74 98.6  F (37  C) Oral 91 91 16 99 % 61.2 kg (135 lb)         Physical Exam  General: Patient is alert and interactive when I enter the room  Head:  The scalp, face, and head appear normal  Eyes:  Conjunctivae are normal  ENT:    The nose is normal    Pinnae are normal    External acoustic canals are normal  Neck:  Trachea midline  CV:  Pulses are normal left dp/pt.   Resp:  No respiratory distress   Musc:  Normal muscular tone    Left lateral ankle swelling, tender lateral malleoli but not medial. No foot or high fibula tenderness    No asymmetric leg swelling    Good capillary refill noted  Skin:  No rash or lesions noted  Neuro:  Speech is normal and fluent. Face is symmetric.     Moving all extremities well.   Psych:  Awake. Alert.  Normal affect.  Appropriate interactions.        Emergency Department Course      Imaging:  Radiology findings were communicated with the patient who voiced understanding of the findings.    XR Left Ankle G/E 3 views:  Mild lateral soft tissue swelling. No fracture. Normal alignment, as per radiology.      Procedures:  None     Interventions:  None    Emergency Department Course:  Past medical records, nursing notes, and vitals reviewed.    2007 I performed an exam of the patient as documented above.     The patient was sent for a ankle XR while in the emergency department, results above.     2258 Patient rechecked and updated.      Findings and plan explained to the Patient. Patient discharged home with instructions regarding supportive care, medications, and reasons to return. The importance of close follow-up was reviewed.    I personally reviewed the imaging results with the Patient and answered all related questions prior to discharge.      Impression & Plan     Medical Decision Making:  Lisa Cuellar is a 34 year old male who presents for evaluation of ankle pain.  Signs and symptoms are consistent with an ankle sprain.  This involves 2/3 ligaments of the lateral ankle by clinical exam. No signs of septic arthritis, gout, pseudogout, fracture, cellulitis, etc.  There are no signs of fracture.  The patients neurovascular status is normal. A head to toe trauma exam is otherwise negative; the likelihood of other serious sequelae of trauma (spine, head, chest, abdomen, other extremities, pelvis) is low.  Plan is for protected weightbearing, RICE treatment with ice 15 minutes on, 1 hour off, ace wrap.  Patient will advance weightbearing and follow-up with primary in 2-3 days.  They will begin gentle ROM exercises of the ankle including PF,DF, alphabet exercises.     Critical Care Time: was 0 minutes for this patient excluding procedures    Discharge Diagnosis:    ICD-10-CM    1. Sprain of left ankle, unspecified ligament, initial encounter S93.402A        Disposition:  Discharged to  home.      Scribe Disclosure:  I, Alexa Barajas, am serving as a scribe at 10:07 PM on 1/25/2020 to document services personally performed by Mayank Manzano MD based on my observations and the provider's statements to me.     1/25/2020   [facility] EMERGENCY DEPARTMENT       Mayank Manzano MD  01/25/20 [id number]

## 2021-03-11 ENCOUNTER — OFFICE VISIT (OUTPATIENT)
Dept: FAMILY MEDICINE | Facility: CLINIC | Age: 36
End: 2021-03-11

## 2021-03-11 VITALS
WEIGHT: 128 LBS | SYSTOLIC BLOOD PRESSURE: 100 MMHG | OXYGEN SATURATION: 98 % | HEART RATE: 68 BPM | TEMPERATURE: 97.7 F | HEIGHT: 63 IN | DIASTOLIC BLOOD PRESSURE: 62 MMHG | BODY MASS INDEX: 22.68 KG/M2

## 2021-03-11 DIAGNOSIS — K29.00 ACUTE GASTRITIS WITHOUT HEMORRHAGE, UNSPECIFIED GASTRITIS TYPE: Primary | ICD-10-CM

## 2021-03-11 PROCEDURE — 99213 OFFICE O/P EST LOW 20 MIN: CPT | Performed by: PHYSICIAN ASSISTANT

## 2021-03-11 RX ORDER — OMEPRAZOLE 10 MG/1
10 CAPSULE, DELAYED RELEASE ORAL DAILY
COMMUNITY
End: 2021-03-11

## 2021-03-11 RX ORDER — OMEPRAZOLE 40 MG/1
40 CAPSULE, DELAYED RELEASE ORAL DAILY
Qty: 30 CAPSULE | Refills: 1 | Status: SHIPPED | OUTPATIENT
Start: 2021-03-11

## 2021-03-11 ASSESSMENT — MIFFLIN-ST. JEOR: SCORE: 1400.73

## 2021-03-11 NOTE — NURSING NOTE
Angel is here for stomach pain and pain under rib cage, pt thinks it is an ulcer, has hx of acid reflux.        Pre-visit Screening:  Immunizations:  up to date  Colonoscopy:  NA  Mammogram: NA  Asthma Action Test/Plan:  NA  PHQ9:  NA phq2 done today  GAD7:  NA  Questioned patient about current smoking habits Pt. smokes 10 cigerettes a day  Ok to leave detailed message on voice mail for today's visit only Yes, phone # cell

## 2021-03-11 NOTE — PATIENT INSTRUCTIONS
Consistent with inflammation of stomach lining/reflux. SUspect that intermittent omeprazole hasn't been enough to heal inflammation, so recommended more consistent therapy.     Will prescribe omeprazole 40 mg daily to take 30-60 minutes. Plan on taking this for at least 2 weeks, but could consider taking for 8 weeks as long as seeing gradual improvement.     Encouraged Angel to work on lifestyle changes to prevent further flares of this. Minimizing spicy food, acidic foods, fatty foods. Avoid eating within 3 hours of bedtime.     Contact me in 1 week if not significantly better, or sooner with worsening. Would likely coordindate GI referral, scope, H. Pylori mentioned.

## 2021-03-11 NOTE — PROGRESS NOTES
"CC: Stomach pains    History:  Stomach issues:  Angel is here today with 3 weeks of stomach pain. Pain is in epigastric area. Comes and goes. Can happen after eating, but can also can happen fasting. Does not happen every day, but is happening most days. Yesterday ate spicy chicken sandwich and was okay 3 hours later had hamburger, and 15-20 minutes pain started and continued overnight, and just now resolved earlier this afternoon. Often is there waking up in the morning. Denies any nausea, but does feel like stomach is bloating, or \"has air in in\". Feels good to put pressure on stomach. No blood in stool, dark stool.     Has not been under much stress, not taking NSAIDs. Does tend to eat spicy food.     So far has taken omeprazole OTC on occasion, which does help after a few hours.     PMH, MEDICATIONS, ALLERGIES, SOCIAL AND FAMILY HISTORY in EPIC and reviewed by me personally.    ROS negative other than the symptoms noted above in the HPI.    Examination   /62 (BP Location: Left arm, Patient Position: Sitting, Cuff Size: Adult Large)   Pulse 68   Temp 97.7  F (36.5  C) (Temporal)   Ht 1.6 m (5' 3\")   Wt 58.1 kg (128 lb)   SpO2 98%   BMI 22.67 kg/m         Constitutional: Sitting comfortably, in no acute distress. Vital signs noted  Mouth and throat: without erythema or lesions of the mucosa  Neck:  no adenopathy, trachea midline and normal to palpation, thyroid normal to palpation  Cardiovascular:  regular rate and rhythm, no murmurs, clicks, or gallops  Respiratory:  normal respiratory rate and rhythm, lungs clear to auscultation  Abdomen: Abdomen soft, Mild epigastric tenderness, but improves with pressure. BS normal. No masses, organomegaly. Garvin sign negative.   SKIN: No jaundice/pallor/rash.   Psychiatric: mentation appears normal and affect normal/bright        A/P    ICD-10-CM    1. Acute gastritis without hemorrhage, unspecified gastritis type  K29.00 omeprazole (PRILOSEC) 40 MG DR capsule "       DISCUSSION:  Consistent with inflammation of stomach lining/reflux. Suspect that intermittent omeprazole hasn't been enough to heal inflammation, so recommended more consistent therapy.     Will prescribe omeprazole 40 mg daily to take 30-60 minutes. Plan on taking this for at least 2 weeks, but could consider taking for 8 weeks as long as seeing gradual improvement.     Encouraged Angel to work on lifestyle changes to prevent further flares of this. Minimizing spicy food, acidic foods, fatty foods. Avoid eating within 3 hours of bedtime.     Contact me in 1 week if not significantly better, or sooner with worsening. Would likely coordindate GI referral, scope, H. Pylori mentioned, rule out hiatal hernia.       follow up visit: As needed    Christen Luna PA-C  [facility]

## 2021-04-23 ENCOUNTER — IMMUNIZATION (OUTPATIENT)
Dept: NURSING | Facility: CLINIC | Age: 36
End: 2021-04-23
Payer: COMMERCIAL

## 2021-04-23 PROCEDURE — 0011A PR COVID VAC MODERNA 100 MCG/0.5 ML IM: CPT

## 2021-04-23 PROCEDURE — 91301 PR COVID VAC MODERNA 100 MCG/0.5 ML IM: CPT

## 2021-05-21 ENCOUNTER — IMMUNIZATION (OUTPATIENT)
Dept: NURSING | Facility: CLINIC | Age: 36
End: 2021-05-21
Attending: INTERNAL MEDICINE
Payer: COMMERCIAL

## 2021-05-21 PROCEDURE — 91301 PR COVID VAC MODERNA 100 MCG/0.5 ML IM: CPT

## 2021-05-21 PROCEDURE — 0012A PR COVID VAC MODERNA 100 MCG/0.5 ML IM: CPT

## 2021-06-22 ENCOUNTER — OFFICE VISIT (OUTPATIENT)
Dept: FAMILY MEDICINE | Facility: CLINIC | Age: 36
End: 2021-06-22

## 2021-06-22 VITALS
TEMPERATURE: 98.1 F | WEIGHT: 128 LBS | BODY MASS INDEX: 22.68 KG/M2 | HEART RATE: 66 BPM | OXYGEN SATURATION: 98 % | DIASTOLIC BLOOD PRESSURE: 58 MMHG | SYSTOLIC BLOOD PRESSURE: 102 MMHG | HEIGHT: 63 IN

## 2021-06-22 DIAGNOSIS — M54.16 LUMBAR RADICULOPATHY: Primary | ICD-10-CM

## 2021-06-22 PROCEDURE — 99213 OFFICE O/P EST LOW 20 MIN: CPT | Performed by: PHYSICIAN ASSISTANT

## 2021-06-22 RX ORDER — CYCLOBENZAPRINE HCL 10 MG
10 TABLET ORAL 3 TIMES DAILY PRN
Qty: 30 TABLET | Refills: 0 | Status: SHIPPED | OUTPATIENT
Start: 2021-06-22 | End: 2021-12-13

## 2021-06-22 RX ORDER — METHYLPREDNISOLONE 4 MG
TABLET, DOSE PACK ORAL
Qty: 21 TABLET | Refills: 0 | Status: SHIPPED | OUTPATIENT
Start: 2021-06-22 | End: 2021-12-13

## 2021-06-22 ASSESSMENT — MIFFLIN-ST. JEOR: SCORE: 1400.73

## 2021-06-22 NOTE — PROGRESS NOTES
"CC: R hip pain    History:  10 days ago, on Saturday went to chiropractor for routine adjustment. Following day felt a soreness in right low back, and by following day could hardly bend at waist. Developed radiating pain down right buttock, lateral hip, and just past knee. Went back to chiropractor the following Mon, Tues, Thurs, Friday, without improvement. Has been heating, icing most days. Hot water in shower has been the most soothing. Has been taking ibuprofen, but only on occasion. Cannot think of any trigger/injuries. No history of surgery, disc injuries. No loss of bowel or bladder control. No numbness/tingling in groin. Difficult to stand, walk, bend in the morning until it loosens up in the shower. Generally feels better moving. 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     PMH, MEDICATIONS, ALLERGIES, SOCIAL AND FAMILY HISTORY in [facility] and reviewed by me personally.    ROS negative other than the symptoms noted above in the HPI.    Examination   /58 (BP Location: Left arm, Patient Position: Sitting, Cuff Size: Adult Large)   Pulse 66   Temp 98.1  F (36.7  C) (Temporal)   Ht 1.6 m (5' 3\")   Wt 58.1 kg (128 lb)   SpO2 98%   BMI 22.67 kg/m       Constitutional: Sitting comfortably, in no acute distress. Vital signs noted  M/S: ROM of back intact, ROM of hips intact. Lateral buttock pain with full flexion of hip. No tenderness to palpation over spine, Paraspinous muscle, sciatic nerve.   NEURO:  muscle strength normal, sensation to light touch and pinprick normal, reflexes normal and symmetric  SKIN: No jaundice/pallor/rash.   Psychiatric: mentation appears normal and affect normal/bright        A/P    ICD-10-CM    1. Lumbar radiculopathy  M54.16 methylPREDNISolone (MEDROL DOSEPAK) 4 MG tablet therapy pack     cyclobenzaprine (FLEXERIL) 10 MG tablet       DISCUSSION:  Consistent with L5/S1 nerve pain, although slightly " "more consistent with S1 distributing. Suspect this could be sciatic nerve pain/sciatica, but cannot rule out possible disc issue or even muscle tension causing nerve pain. Prescribed medrol dose pack. Follow instructions on package. Take with food. Warned of side effects like drowsiness, jitteriness, nausea, diarrhea, constipation, weight changes, irritability, mood swings, and to contact me if noted. Continue to alternate icing/heating 2-3 times daily if possible. Also recommended trial of cyclobenzaprine (Flexaril) muscle relaxant that pt can take up to 3 times daily. Warned them of possible side effects, including drowsiness, and no driving if drowsy.      In general while on steroid, okay to take Tylenol, but would avoid ibuprofen.     Offered work excuse, but pt feels that he does okay at work.     Contact me later this week if not seeing noticeable improvement, and would likely refer to ortho/PT. Contact me sooner if worsening. Go to the ER with significant worsening, loss of bowel/bladder control, \"saddle numbness.\"      follow up visit: As needed    Christen Luna PA-C  [facility]    "

## 2021-06-22 NOTE — PATIENT INSTRUCTIONS
"Consistent with L5/S1 nerve pain, although slightly more consistent with S1. Suspect this could be sciatic nerve pain/sciatica, but cannot rule out possible disc issue or even muscle tension causing nerve pain. Also prescribed medrol dose pack. Follow instructions on package. Take with food. Warned of side effects like drowsiness, jitteriness, nausea, diarrhea, constipation, weight changes, irritability, mood swings, and to contact me if noted. Continue to alternate icing/heating 2-3 times daily if possible. Also recommended trial of cyclobenzaprine (Flexaril) muscle relaxant that pt can take up to 3 times daily. Warned them of possible side effects, including drowsiness, and no driving if drowsy.     In general while on steroid, okay to take Tylenol, but would avoid ibuprofen.     Contact me later this week if not seeing noticeable improvement, and would likely refer to ortho/PT. Contact me sooner if worsening. Go to the ER with significant worsening, loss of bowel/bladder control, \"saddle numbness.\"        "

## 2021-06-22 NOTE — NURSING NOTE
Angel is here for right leg pain, pt had a chiropractic adjustment a week ago, had 3 adjustments after that ot try and correct it. The pain shoots from lower back down right leg into his calf        Pre-visit Screening:  Immunizations:  up to date  Colonoscopy:  NA  Mammogram: NA  Asthma Action Test/Plan:  NA  PHQ9:  NA  GAD7:  NA  Questioned patient about current smoking habits Pt. recently quit smoking.  Ok to leave detailed message on voice mail for today's visit only Yes, phone # [phone]

## 2021-12-13 ENCOUNTER — OFFICE VISIT (OUTPATIENT)
Dept: FAMILY MEDICINE | Facility: CLINIC | Age: 36
End: 2021-12-13

## 2021-12-13 VITALS
RESPIRATION RATE: 22 BRPM | WEIGHT: 131 LBS | TEMPERATURE: 97.9 F | BODY MASS INDEX: 23.21 KG/M2 | SYSTOLIC BLOOD PRESSURE: 102 MMHG | HEART RATE: 96 BPM | DIASTOLIC BLOOD PRESSURE: 68 MMHG | OXYGEN SATURATION: 97 % | HEIGHT: 63 IN

## 2021-12-13 DIAGNOSIS — M54.16 RIGHT LUMBAR RADICULITIS: Primary | ICD-10-CM

## 2021-12-13 PROCEDURE — 99214 OFFICE O/P EST MOD 30 MIN: CPT | Performed by: STUDENT IN AN ORGANIZED HEALTH CARE EDUCATION/TRAINING PROGRAM

## 2021-12-13 PROCEDURE — 72100 X-RAY EXAM L-S SPINE 2/3 VWS: CPT | Performed by: STUDENT IN AN ORGANIZED HEALTH CARE EDUCATION/TRAINING PROGRAM

## 2021-12-13 RX ORDER — PREDNISONE 20 MG/1
TABLET ORAL
Qty: 15 TABLET | Refills: 0 | Status: SHIPPED | OUTPATIENT
Start: 2021-12-13 | End: 2021-12-23

## 2021-12-13 RX ORDER — CYCLOBENZAPRINE HCL 10 MG
10 TABLET ORAL 3 TIMES DAILY PRN
Qty: 30 TABLET | Refills: 0 | Status: SHIPPED | OUTPATIENT
Start: 2021-12-13 | End: 2022-04-14

## 2021-12-13 ASSESSMENT — MIFFLIN-ST. JEOR: SCORE: 1414.34

## 2021-12-13 NOTE — PROGRESS NOTES
ASSESSMENT & PLAN    1. Right lumbar radiculitis  Right sided lumbar radiculitis, XRs obtained and reviewed with patient, demonstrating L5-S1 disc degen that correlates with sx. No red flags. Recommend repeat steroid and flexeril while initiating PT, follow-up in 4-6 weeks, sooner with any new or worsening sx.   - XR Lumbar Spine 2/3 Views  - predniSONE (DELTASONE) 20 MG tablet; Take 2 tablets (40 mg) by mouth daily for 5 days, THEN 1 tablet (20 mg) daily for 5 days. Take with breakfast  Dispense: 15 tablet; Refill: 0  - cyclobenzaprine (FLEXERIL) 10 MG tablet; Take 1 tablet (10 mg) by mouth 3 times daily as needed for muscle spasms  Dispense: 30 tablet; Refill: 0  - Physical Therapy Referral; Future      Patient Instructions   Prednisone with breakfast    Flexeril at bedtime if needed     Call to schedule PT  Physical Therapy  [facility]   [address] [place] [id number]  [phone] -- appt line  [phone] -- fax     Let me know how you're doing in a few weeks       Win Ivan MD, [facility]      -----    Chief Complaint   Patient presents with     Follow Up     right leg has been in pain for awhile, doing better today however over the weekend it was severe, pain is now moving down to the ankle area, feels that he is feeling in the ankle bone, when sitting in the car more then 5 minutes pain starts to become bad again, this has been going on since June on and off, only feels the pain when sitting down for long periods of time or getting up, when standing it does not hurt at all       SUBJECTIVE  Lisa Cuellar is a/an 36 year old male who is seen for evaluation of ongoing radicular sx.     The patient is seen by themselves.    Date of Onset: June 2021. Initially sore back from lifting, gradually developed right sided posterior radicular sx  Worsened by: bending forward, prolonged sitting  Better with: stretches  Treatments tried: medrol dose arti and  "flexeril were initially helpful  Associated symptoms: no focal weakness, bowel or bladder changes denies swelling or warmth    Orthopedic/Surgical history: NO  Social History/Occupation: janitorial work at night    No family history pertinent to patient's problem today.       OBJECTIVE:  /68 (BP Location: Left arm, Patient Position: Sitting, Cuff Size: Adult Large)   Pulse 96   Temp 97.9  F (36.6  C) (Temporal)   Resp 22   Ht 1.6 m (5' 3\")   Wt 59.4 kg (131 lb)   SpO2 97%   BMI 23.21 kg/m     General: healthy, alert and in no distress  HEENT: no scleral icterus or conjunctival erythema  Skin: no visible suspicious lesions or rashes.   CV: no pedal edema   Resp: normal respiratory effort without conversational dyspnea   Psych: normal mood and affect  Gait: normal, with appropriate coordination and balance     MSK:   Full lumbar ROM, +sx with flexion and +slump on R  SILT and 5/5 str throughout BLE  No ankle clonus  Distal pulses intact    RADIOLOGY:  Radiology read pending, on my review normal alignment with moderate L5 s1 disc degeneration    "

## 2021-12-13 NOTE — NURSING NOTE
Chief Complaint   Patient presents with     Follow Up     right leg has been in pain for awhile, doing better today however over the weekend it was severe, pain is now moving down to the ankle area, feels that he is feeling in the ankle bone, when sitting in the car more then 5 minutes pain starts to become bad again, this has been going on since June on and off, only feels the pain when sitting down for long periods of time or getting up, when standing it does not hurt at all     Pre-visit Screening:  Immunizations:  up to date  Colonoscopy:  NA  Mammogram: NA  Asthma Action Test/Plan:  NA  PHQ9:  NA  GAD7:  NA  Questioned patient about current smoking habits Pt. quit smoking some time ago.  Ok to leave detailed message on voice mail for today's visit only Yes, phone # [phone]

## 2021-12-17 ENCOUNTER — TELEPHONE (OUTPATIENT)
Dept: FAMILY MEDICINE | Facility: CLINIC | Age: 36
End: 2021-12-17

## 2021-12-22 ENCOUNTER — TELEPHONE (OUTPATIENT)
Dept: FAMILY MEDICINE | Facility: CLINIC | Age: 36
End: 2021-12-22

## 2021-12-22 NOTE — TELEPHONE ENCOUNTER
Pt called stating he is in a lot of pain, his leg is hurting to the point it is hard to sleep. He is doing PT, flexeril at night, and almost done with his prednisone pack. He is also taking tylenol and ibuprofen. He hast PT today (12/22) and acupuncture tomorrow (12/23).  He is aware you are out of the office and is willing to wait until we hear back from you. We did discuss [facility] urgent care.    Please advise on what he can do for pain # [phone]    Thanks, Flaquita

## 2021-12-23 NOTE — TELEPHONE ENCOUNTER
Notified pt. He will try these ideas and go to urgent care if needed. He had no further questions or concerns.

## 2021-12-23 NOTE — TELEPHONE ENCOUNTER
Please ensure patient is taking 800mg ibuprofen TID and 1000mg tylenol TID. Can take additional dose of flexeril during the day if not driving/working as can be sedating. Can try heating pad, ice, going for a walk...all can be helpful for back pain. If pain uncontrolled on this regimen he should be evaluated in urgent care. Win Ivan MD on 12/23/2021 at 2:06 PM

## 2022-04-14 ENCOUNTER — OFFICE VISIT (OUTPATIENT)
Dept: FAMILY MEDICINE | Facility: CLINIC | Age: 37
End: 2022-04-14

## 2022-04-14 VITALS
OXYGEN SATURATION: 99 % | RESPIRATION RATE: 20 BRPM | SYSTOLIC BLOOD PRESSURE: 98 MMHG | TEMPERATURE: 98 F | HEART RATE: 70 BPM | DIASTOLIC BLOOD PRESSURE: 68 MMHG | BODY MASS INDEX: 23.74 KG/M2 | WEIGHT: 134 LBS

## 2022-04-14 DIAGNOSIS — M25.862 CYST OF LEFT KNEE JOINT: Primary | ICD-10-CM

## 2022-04-14 PROCEDURE — 99213 OFFICE O/P EST LOW 20 MIN: CPT | Performed by: PHYSICIAN ASSISTANT

## 2022-04-14 NOTE — NURSING NOTE
Chief Complaint   Patient presents with     Knee Pain     Lump on left knee, did go down in size, does not hurt to the touch but is more irritating, no known injury, not sure what it is        Pre-visit Screening:  Immunizations:  up to date  Colonoscopy:  NA  Mammogram: NA  Asthma Action Test/Plan:  NA  PHQ9:  PHQ-2 done today   GAD7:  No concerns  Questioned patient about current smoking habits Pt. quit smoking some time ago.  Ok to leave detailed message on voice mail for today's visit only yes, phone # [phone]

## 2022-04-14 NOTE — PROGRESS NOTES
Assessment & Plan     Cyst of left knee joint-appears to be either simple edema or possible Baker's cyst. Encouraged by lack of pain and normal exam. Will optimize conservative treatment and see if the swelling goes away.  Ice for 20 minute intervals several times a day  Rest with leg above heart level to reduce swelling  Do not do work requiring you to get on knees and rest from physical work as much as possible  Ibuprofen 400mg every 8hrs for at least 5 days  Knee brace to help reduce swelling    Follow up with Dr. Ivan if symptoms do not improve with conservative treatment    No follow-ups on file.    Doron Bledsoe, DOV  [facility]    Subjective   Angel is a 37 year old who presents for the following health issues     HPI     Patient has had swelling below his left patella for the last two weeks. No symptoms in his R knee. No known trauma-the only inciting factor he can think of is that he braced a heavy object against his L knee around 2 weeks ago. He is frequently on his knees for work and is generally very active in his work. No pain with this issue. He is able to work normally with this issue. No leg weakness, no instability of knee joint and no locking of knee. He feels the knee has slowly improved over the last few days. Patient denies fever/chills, heat or erythema of area. He has tried occasional ice on the knee but nothing on a consistent basis.    Review of Systems   Constitutional, HEENT, cardiovascular, pulmonary, gi and gu systems are negative, except as otherwise noted.      Objective    BP 98/68 (BP Location: Right arm, Patient Position: Sitting, Cuff Size: Adult Large)   Pulse 70   Temp 98  F (36.7  C) (Temporal)   Resp 20   Wt 60.8 kg (134 lb)   SpO2 99%   BMI 23.74 kg/m    Body mass index is 23.74 kg/m .  Physical Exam   GENERAL: healthy, alert and no distress  HENT: ear canals and TM's normal, nose and mouth without ulcers or lesions  RESP: lungs clear to auscultation  "- no rales, rhonchi or wheezes  CV: regular rate and rhythm, normal S1 S2, no S3 or S4, no murmur, click or rub, no peripheral edema and peripheral pulses strong  ABDOMEN: soft, nontender, no hepatosplenomegaly, no masses and bowel sounds normal  MS: normal muscle tone, normal strength in all extremities, R knee: no abnormalities, L knee: 1\"x1\" soft fluid filled area noted below L patella in the midline, no erythema or heat noted, no pain on palpation of knee, negative anterior/posterior drawer test, negative valgus/varus stress test  SKIN: no suspicious lesions or rashes  NEURO: Normal strength and tone, mentation intact and speech normal  PSYCH: mentation appears normal, affect normal/bright            "

## 2022-05-20 DIAGNOSIS — K29.00 ACUTE GASTRITIS WITHOUT HEMORRHAGE, UNSPECIFIED GASTRITIS TYPE: ICD-10-CM

## 2022-05-20 RX ORDER — OMEPRAZOLE 40 MG/1
40 CAPSULE, DELAYED RELEASE ORAL DAILY
Qty: 30 CAPSULE | Refills: 1 | OUTPATIENT
Start: 2022-05-20

## 2022-05-20 NOTE — TELEPHONE ENCOUNTER
Pt would need appt for this prescription refilled as it hasn't been prescribed for over 1 year, and was only for short term use. He could consider trying Prilosec OTC/omeprazole 20 mg if he thinks he is having reflux. Please inform

## 2022-05-20 NOTE — TELEPHONE ENCOUNTER
Patient called in and requested a refill of  Pending Prescriptions:                       Disp   Refills    omeprazole (PRILOSEC) 40 MG DR capsule    30 cap*1            Sig: Take 1 capsule (40 mg) by mouth daily    Routing to Christen for review, this has not been prescribed in awhile but patient said that he uses as needed. He has been seen for other things recently but do you want patient to come in for an OV?

## 2022-06-04 ENCOUNTER — HEALTH MAINTENANCE LETTER (OUTPATIENT)
Age: 37
End: 2022-06-04

## 2022-10-09 ENCOUNTER — HEALTH MAINTENANCE LETTER (OUTPATIENT)
Age: 37
End: 2022-10-09

## 2023-01-26 ENCOUNTER — HOSPITAL ENCOUNTER (EMERGENCY)
Facility: CLINIC | Age: 38
Discharge: HOME | End: 2023-01-26
Attending: EMERGENCY MEDICINE | Admitting: EMERGENCY MEDICINE
Payer: OTHER MISCELLANEOUS

## 2023-01-26 VITALS
HEART RATE: 66 BPM | TEMPERATURE: 97.6 F | OXYGEN SATURATION: 100 % | DIASTOLIC BLOOD PRESSURE: 111 MMHG | SYSTOLIC BLOOD PRESSURE: 114 MMHG | RESPIRATION RATE: 16 BRPM

## 2023-01-26 DIAGNOSIS — R20.2 PARESTHESIA OF ARM: ICD-10-CM

## 2023-01-26 DIAGNOSIS — M79.631 PAIN IN BOTH FOREARMS: ICD-10-CM

## 2023-01-26 DIAGNOSIS — M79.632 PAIN IN BOTH FOREARMS: ICD-10-CM

## 2023-01-26 PROCEDURE — 99282 EMERGENCY DEPT VISIT SF MDM: CPT

## 2023-01-26 NOTE — ED NOTES
Took over care of the patient- patient has pain in his elbow. Tried to go to UC but they were closed so came to ED for a work note

## 2023-01-26 NOTE — DISCHARGE INSTRUCTIONS
I am worried you may have a tendinitis or neuropathy related to your work environment.  If symptoms do not improve with your stopping  use  of high-powered/vibrating tools, follow-up with neurology for further evaluation/nerve studies.  Return for new or worsening symptoms.  In meantime, you can take ibuprofen 600 mg every 8 hours as needed for pain.

## 2023-01-26 NOTE — ED PROVIDER NOTES
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  Over the past few days symptoms have been getting worse and radiating into his bilateral thumbs and index fingers as well.  Symptoms are only present while at work using this drill and not at any other time.  He denies weakness in his arms or neck pain.      ROS:  Review of Systems  A 10 point ROS was obtained and negative except as noted here and in HPI    Allergies:  No Known Allergies     Medications:    cholecalciferol (VITAMIN D3) 69841 UNITS capsule  omeprazole (PRILOSEC) 40 MG DR capsule        Past Medical History:    No past medical history on file.    Past Surgical History:    Past Surgical History:   Procedure Laterality Date     none          Family History:    family history includes C.A.D. in his father and paternal uncle; Hypertension in his father; Lipids in his father.    Social History:   reports that he quit smoking about 19 months ago. His smoking use included cigarettes. He smoked an average of .5 packs per day. He has never used smokeless tobacco. He reports current alcohol use. He reports current drug use. Frequency: 1.00 time per week. Drug: Marijuana.  PCP: [facility]     Physical Exam     Patient Vitals for the past 24 hrs:   BP Temp Temp src Pulse Resp SpO2   01/26/23 1540 (!) 114/111 97.6  F (36.4  C) Temporal 66 16 100 %        Physical Exam  VS: Reviewed per above  HENT: Mucous membranes moist, no nuchal rigidity  EYES: sclera anicteric  CV: Rate as noted, regular rhythm.   RESP: Effort normal. Breath sounds are normal bilaterally.  GI: no tenderness/rebound/guarding, not distended.  NEURO: GCS 15, cranial nerves II through XII are intact, 5 out of 5 strength in all 4 extremities, sensation is intact light touch in all 4 extremities.   Negative Phalen and Tinel sign.  MSK: No deformity of the extremities  SKIN: Warm and dry    Emergency Department Course       Emergency Department Course & Assessments:           Disposition:  The patient was discharged to home.     Impression & Plan      Medical Decision Making:  Patient presents to the ER for evaluation of pain and paresthesias in the bilateral forearms with using high-powered vibrating drill at his workplace.  Vital signs reassuring.  No focal neurodeficits appreciated on exam.  As symptoms seem to only occur with using this power tool, lower suspicion for metabolic derangement as cause of his symptoms.  Likewise, lower suspicion for spinal cord pathology.  I am concerned for possible peripheral neuropathy related to his work.  I will refer him to neurology for further evaluation of the symptoms as needed.  In the interim, I recommended that patient avoid using these tools at the workplace to avoid reproducing and/or worsening his underlying symptoms.  Return precautions discussed prior to discharge.      Diagnosis:    ICD-10-CM    1. Pain in both forearms  M79.632     M79.631       2. Paresthesia of arm  R20.2            Discharge Medications:  New Prescriptions    No medications on file      1/26/2023   Glenn Madera MD Lindenbaum, Elan, MD  01/27/23 0030

## 2023-01-26 NOTE — LETTER
January 26, 2023      To Whom It May Concern:      Lisa Cuellar was seen in our Emergency Department today, 01/26/23.  Mr Cuellar should not use high powered vibrating tools due to medical concern. This can be re-evaluated in the future if his primary doctor or specialist clears him at a later date.     Sincerely,        Glenn Madera MD

## 2023-01-26 NOTE — ED TRIAGE NOTES
Pt. Presents to ED for pain in bilateral forearms with intermittent numbness/tingling that has been worsening. 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 Reports the days he doesn't drill he doesn't have pain. Was sent her by his [employer] team. CARRIE on RA.

## 2023-05-19 ENCOUNTER — OFFICE VISIT (OUTPATIENT)
Dept: FAMILY MEDICINE | Facility: CLINIC | Age: 38
End: 2023-05-19

## 2023-05-19 VITALS
HEART RATE: 66 BPM | SYSTOLIC BLOOD PRESSURE: 120 MMHG | HEIGHT: 63 IN | DIASTOLIC BLOOD PRESSURE: 74 MMHG | OXYGEN SATURATION: 98 % | BODY MASS INDEX: 21.97 KG/M2 | TEMPERATURE: 97.4 F | WEIGHT: 124 LBS

## 2023-05-19 DIAGNOSIS — R12 HEARTBURN: ICD-10-CM

## 2023-05-19 DIAGNOSIS — M54.12 RADICULITIS OF LEFT CERVICAL REGION: Primary | ICD-10-CM

## 2023-05-19 PROCEDURE — 99214 OFFICE O/P EST MOD 30 MIN: CPT | Performed by: STUDENT IN AN ORGANIZED HEALTH CARE EDUCATION/TRAINING PROGRAM

## 2023-05-19 RX ORDER — OMEPRAZOLE 40 MG/1
40 CAPSULE, DELAYED RELEASE ORAL DAILY
Qty: 30 CAPSULE | Refills: 0 | Status: SHIPPED | OUTPATIENT
Start: 2023-05-19 | End: 2023-10-11

## 2023-05-19 RX ORDER — TRAMADOL HYDROCHLORIDE 50 MG/1
1 TABLET ORAL
COMMUNITY
Start: 2023-05-13 | End: 2023-10-11

## 2023-05-19 RX ORDER — CYCLOBENZAPRINE HCL 10 MG
1 TABLET ORAL
COMMUNITY
Start: 2023-05-09 | End: 2023-10-11

## 2023-05-19 RX ORDER — PREDNISONE 20 MG/1
40 TABLET ORAL
Qty: 10 TABLET | Refills: 0 | Status: SHIPPED | OUTPATIENT
Start: 2023-05-19 | End: 2023-05-24

## 2023-05-19 NOTE — PROGRESS NOTES
"ASSESSMENT & PLAN      ICD-10-CM    1. Radiculitis of left cervical region  M54.12 Physical Therapy Referral     predniSONE (DELTASONE) 20 MG tablet      2. Heartburn  R12 omeprazole (PRILOSEC) 40 MG DR capsule        Atraumatic neck and arm pain. No red flags, suspect cervical origin of sx.   Continue ppi with steroid burst and any nsaids  Acute spine PT referral  Pt prescribed tramadol at [facility], discussed reasons I decline to refill   Spine referral if not improving as expected    Patient Instructions   Steroid with breakfast    Spine PT will call you    If no improvement in 1-2 weeks we will coordinate spine referral      Win Ivan MD, [facility]      -----    SUBJECTIVE  Lisa Cuellar is a/an 38 year old male who is seen for evaluation of     Chief Complaint   Patient presents with     Shoulder Injury     Left shoulder pain and muscle tightness.  Pt had acupuncture on his left elbow for a workers comp injury three weeks ago.  This pain started after that. Pt went to [facility] last week for it and had xrays, prescribed cyclobenzaprine and tramadol.       The patient is seen by themselves.  The patient is Left handed    L-sided neck and arm symptoms:     Date of Onset: 3 wks ago  Mechanism of injury: Reports insidious onset without acute precipitating event. Had been treating medial and lateral epicondylitis with acupuncture which was helpful  Location of Pain: \"burning\" left side of neck, circumferential around upper arm, and lateral forearm down to wrist  Worsened by: affecting sleep, lifting arm up  Treatments tried: chiro, [facility] urgent care (medrol, tramadol, and flexeril)  Associated symptoms: no focal weakness    Has been working with chiropractor  Worsening hearburn with NSAIDs and steroids    Social History/Occupation: warehouse work, repetitive     Patient's PMH, PSH, and family hx reviewed.      OBJECTIVE:  /74 (BP Location: Right arm, Patient Position: " "Sitting, Cuff Size: Adult Regular)   Pulse 66   Temp 97.4  F (36.3  C) (Temporal)   Ht 1.6 m (5' 3\")   Wt 56.2 kg (124 lb)   SpO2 98%   BMI 21.97 kg/m     Alert, NAD  NC/AT  Sclerae anicteric  Regular  Resp nonlabored  Skin warm and dry  No focal neuro deficits. Speech intact. Normal gait.  Appropriate affect    No midline cervical ttp, L parasp tenderness and tension  +Spurling bilaterally  Full active flex/ext, Rward rotation and L side flexion limited by pain  5/5 str throughout BUE, SILT BUE  Normal tone BUE  Neg alston     RADIOLOGY:  XRs from O reviewed    "

## 2023-05-19 NOTE — PATIENT INSTRUCTIONS
Steroid with breakfast    Spine PT will call you    If no improvement in 1-2 weeks we will coordinate spine referral

## 2023-05-19 NOTE — NURSING NOTE
Chief Complaint   Patient presents with     Shoulder Injury     Left shoulder pain and muscle tightness.  Pt had acupuncture on his left elbow for a workers comp injury three weeks ago.  This pain started after that. Pt went to [facility] week for it and had xrays, prescribed cyclobenzaprine and tramadol.         Pre-visit Screening:  Immunizations:  up to date  Colonoscopy:  NA  Mammogram: NA  Asthma Action Test/Plan:  NA  PHQ9:  NA  GAD7:  NA  Questioned patient about current smoking habits Pt. Quit some time ago  Ok to leave detailed message on voice mail for today's visit only Yes, phone # [phone]

## 2023-06-10 ENCOUNTER — HEALTH MAINTENANCE LETTER (OUTPATIENT)
Age: 38
End: 2023-06-10

## 2023-06-10 DIAGNOSIS — R12 HEARTBURN: ICD-10-CM

## 2023-06-13 ENCOUNTER — TELEPHONE (OUTPATIENT)
Dept: FAMILY MEDICINE | Facility: CLINIC | Age: 38
End: 2023-06-13

## 2023-06-13 RX ORDER — OMEPRAZOLE 40 MG/1
CAPSULE, DELAYED RELEASE ORAL
Qty: 30 CAPSULE | Refills: 0 | COMMUNITY
Start: 2023-06-13

## 2023-06-13 NOTE — TELEPHONE ENCOUNTER
Lisa Cuellar is requesting a refill of:    Refused Prescriptions:                       Disp   Refills    omeprazole (PRILOSEC) 40 MG DR capsule [Ph*30 cap*0        Sig: TAKE 1 CAPSULE BY MOUTH EVERY DAY  Refused By: GONZALEZ ZIMMERMAN  Reason for Refusal: Patient should contact provider first    Was given while taking steroids and NSAIDS

## 2023-06-13 NOTE — TELEPHONE ENCOUNTER
I talked with patient regarding the MRI.t Dr. Ivan is not ordering the MRI. Patient said that [facility] is ordering the MRI. Patient was advised that he will need to call [facility]. [facility] will have to do the peer to peer.     Patient will call [facility]

## 2023-06-13 NOTE — TELEPHONE ENCOUNTER
Pt left a voicemail this morning stating that the MRI that Dr. Ivan ordered to be done at [facility] cannot be scheduled yet due to insurance.  Dr. Ivan needs to call insurance or do a Peer to Peer.    Routing to Danyell for assistance, thanks.        Pt phone # [phone]

## 2023-10-11 ENCOUNTER — OFFICE VISIT (OUTPATIENT)
Dept: FAMILY MEDICINE | Facility: CLINIC | Age: 38
End: 2023-10-11

## 2023-10-11 VITALS
TEMPERATURE: 97.6 F | HEIGHT: 63 IN | SYSTOLIC BLOOD PRESSURE: 116 MMHG | BODY MASS INDEX: 22.15 KG/M2 | OXYGEN SATURATION: 98 % | WEIGHT: 125 LBS | HEART RATE: 88 BPM | RESPIRATION RATE: 18 BRPM | DIASTOLIC BLOOD PRESSURE: 74 MMHG

## 2023-10-11 DIAGNOSIS — Z13.220 SCREENING FOR LIPOID DISORDERS: ICD-10-CM

## 2023-10-11 DIAGNOSIS — Z13.228 SCREENING FOR ENDOCRINE, METABOLIC AND IMMUNITY DISORDER: ICD-10-CM

## 2023-10-11 DIAGNOSIS — Z13.0 SCREENING FOR ENDOCRINE, METABOLIC AND IMMUNITY DISORDER: ICD-10-CM

## 2023-10-11 DIAGNOSIS — Z00.00 ROUTINE GENERAL MEDICAL EXAMINATION AT A HEALTH CARE FACILITY: Primary | ICD-10-CM

## 2023-10-11 DIAGNOSIS — Z13.29 SCREENING FOR ENDOCRINE, METABOLIC AND IMMUNITY DISORDER: ICD-10-CM

## 2023-10-11 DIAGNOSIS — Z11.4 SCREENING FOR HIV (HUMAN IMMUNODEFICIENCY VIRUS): ICD-10-CM

## 2023-10-11 DIAGNOSIS — Z11.59 ENCOUNTER FOR HEPATITIS C SCREENING TEST FOR LOW RISK PATIENT: ICD-10-CM

## 2023-10-11 LAB
BUN SERPL-MCNC: 16 MG/DL (ref 7–25)
BUN/CREATININE RATIO: 16.8 (ref 6–32)
CALCIUM SERPL-MCNC: 8.9 MG/DL (ref 8.6–10.3)
CHLORIDE SERPLBLD-SCNC: 106.3 MMOL/L (ref 98–110)
CHOLEST SERPL-MCNC: 211 MG/DL (ref 0–199)
CHOLEST/HDLC SERPL: 5 {RATIO} (ref 0–5)
CO2 SERPL-SCNC: 23.6 MMOL/L (ref 20–32)
CREAT SERPL-MCNC: 0.95 MG/DL (ref 0.6–1.3)
GLUCOSE SERPL-MCNC: 107 MG/DL (ref 60–99)
HDLC SERPL-MCNC: 46 MG/DL (ref 40–150)
LDLC SERPL CALC-MCNC: 142 MG/DL (ref 0–130)
POTASSIUM SERPL-SCNC: 4.04 MMOL/L (ref 3.5–5.3)
SODIUM SERPL-SCNC: 139.7 MMOL/L (ref 135–146)
TRIGL SERPL-MCNC: 116 MG/DL (ref 0–149)

## 2023-10-11 PROCEDURE — 80048 BASIC METABOLIC PNL TOTAL CA: CPT | Performed by: PHYSICIAN ASSISTANT

## 2023-10-11 PROCEDURE — 36415 COLL VENOUS BLD VENIPUNCTURE: CPT | Performed by: PHYSICIAN ASSISTANT

## 2023-10-11 PROCEDURE — 99395 PREV VISIT EST AGE 18-39: CPT | Performed by: PHYSICIAN ASSISTANT

## 2023-10-11 PROCEDURE — 80061 LIPID PANEL: CPT | Performed by: PHYSICIAN ASSISTANT

## 2023-10-11 RX ORDER — KETOCONAZOLE 20 MG/ML
SHAMPOO TOPICAL
COMMUNITY
Start: 2023-09-19

## 2023-10-11 NOTE — PROGRESS NOTES
"SUBJECTIVE:   CC: Angel is an 38 year old who presents for preventative health visit.     HPI    Diet-poor. Lots of fast food.   Exercise-active with work  Sleep-good, 5-6hrs/night.   BM-daily, no concerns  Vitamin Use-none  Dentist-2x a year  Eye Doctor-not UTD      Social History     Tobacco Use    Smoking status: Former     Packs/day: .5     Types: Cigarettes     Quit date: 2021     Years since quittin.3     Passive exposure: Current    Smokeless tobacco: Never   Substance Use Topics    Alcohol use: Yes     Alcohol/week: 0.0 standard drinks of alcohol     Comment: 3-4 per month              No data to display                Last PSA: No results found for: \"PSA\"    Reviewed orders with patient. Reviewed health maintenance and updated orders accordingly - Yes  Lab work is in process    Reviewed and updated as needed this visit by clinical staff   Tobacco  Allergies  Meds   Med Hx  Surg Hx           Reviewed and updated as needed this visit by Provider   Tobacco     Med Hx  Surg Hx              Review of Systems  CONSTITUTIONAL: NEGATIVE for fever, chills, change in weight  INTEGUMENTARY/SKIN: NEGATIVE for worrisome rashes, moles or lesions  EYES: NEGATIVE for vision changes or irritation  ENT: NEGATIVE for ear, mouth and throat problems  RESP: NEGATIVE for significant cough or SOB  CV: NEGATIVE for chest pain, palpitations or peripheral edema  GI: NEGATIVE for nausea, abdominal pain, heartburn, or change in bowel habits   male: negative for dysuria, hematuria, decreased urinary stream, erectile dysfunction, urethral discharge  MUSCULOSKELETAL: NEGATIVE for significant arthralgias or myalgia  NEURO: NEGATIVE for weakness, dizziness or paresthesias  PSYCHIATRIC: NEGATIVE for changes in mood or affect    OBJECTIVE:   /74 (BP Location: Right arm, Patient Position: Sitting, Cuff Size: Adult Regular)   Pulse 88   Temp 97.6  F (36.4  C) (Temporal)   Resp 18   Ht 1.6 m (5' 3\")   Wt 56.7 kg (125 " lb)   SpO2 98%   BMI 22.14 kg/m      Physical Exam  GENERAL: healthy, alert and no distress  EYES: Eyes grossly normal to inspection, PERRL and conjunctivae and sclerae normal  HENT: ear canals and TM's normal, nose and mouth without ulcers or lesions  NECK: no adenopathy, no asymmetry, masses, or scars and thyroid normal to palpation  RESP: lungs clear to auscultation - no rales, rhonchi or wheezes  CV: regular rate and rhythm, normal S1 S2, no S3 or S4, no murmur, click or rub, no peripheral edema and peripheral pulses strong  ABDOMEN: soft, nontender, no hepatosplenomegaly, no masses and bowel sounds normal  MS: no gross musculoskeletal defects noted, no edema  NEURO: Normal strength and tone, mentation intact and speech normal  PSYCH: mentation appears normal, affect normal/bright    Diagnostic Test Results:  Labs reviewed in Epic    ASSESSMENT/PLAN:       ICD-10-CM    1. Routine general medical examination at a health care facility  Z00.00 VENOUS COLLECTION     HIV 1/2 Agn Leonela 4th Gen w Reflex ([facility])     HEPATITIS C ANTIBODY ([facility])     Lipid Panel ([facility])     Basic Metabolic Panel ([facility])      2. Screening for endocrine, metabolic and immunity disorder  Z13.29 VENOUS COLLECTION    Z13.228 Basic Metabolic Panel ([facility])    Z13.0       3. Screening for lipoid disorders  Z13.220 VENOUS COLLECTION     Lipid Panel ([facility])      4. Screening for HIV (human immunodeficiency virus)  Z11.4 VENOUS COLLECTION     HIV 1/2 Agn Leonela 4th Gen w Reflex ([facility])      5. Encounter for hepatitis C screening test for low risk patient  Z11.59 VENOUS COLLECTION     HEPATITIS C ANTIBODY ([facility])          Patient has been advised of split billing requirements and indicates understanding: Yes      COUNSELING:   Reviewed preventive health counseling, as reflected in patient instructions       Regular exercise       Healthy diet/nutrition       Vision screening       Consider Hep C screening for all patients one time for ages 18-79 years       HIV  screeninx in teen years, 1x in adult years, and at intervals if high risk        He reports that he quit smoking about 2 years ago. His smoking use included cigarettes. He smoked an average of .5 packs per day. He has been exposed to tobacco smoke. He has never used smokeless tobacco.            Doron Bledsoe PA-C  [facility]

## 2023-10-11 NOTE — NURSING NOTE
Chief Complaint   Patient presents with    Physical     Annual, fasting      Pre-visit Screening:  Immunizations:  up to date  Colonoscopy:  na  Mammogram: na  Asthma Action Test/Plan:  na  PHQ9:  phq2 done today   GAD7:  na  Questioned patient about current smoking habits Pt. quit smoking some time ago.  Ok to leave detailed message on voice mail for today's visit only yes, phone # [phone] (home) [phone] (work)

## 2023-10-12 LAB
HCV AB - QUEST: NORMAL
HIV 1/2 AGN ABY 4TH GEN WITH REFLEX: NORMAL

## 2024-12-21 ENCOUNTER — HEALTH MAINTENANCE LETTER (OUTPATIENT)
Age: 39
End: 2024-12-21